# Patient Record
Sex: MALE | Race: WHITE | NOT HISPANIC OR LATINO
[De-identification: names, ages, dates, MRNs, and addresses within clinical notes are randomized per-mention and may not be internally consistent; named-entity substitution may affect disease eponyms.]

---

## 2019-11-11 ENCOUNTER — APPOINTMENT (OUTPATIENT)
Dept: SURGERY | Facility: CLINIC | Age: 54
End: 2019-11-11
Payer: COMMERCIAL

## 2019-11-11 VITALS
HEIGHT: 67 IN | SYSTOLIC BLOOD PRESSURE: 126 MMHG | TEMPERATURE: 98.3 F | BODY MASS INDEX: 25.11 KG/M2 | WEIGHT: 160 LBS | HEART RATE: 78 BPM | DIASTOLIC BLOOD PRESSURE: 83 MMHG

## 2019-11-11 DIAGNOSIS — Z78.9 OTHER SPECIFIED HEALTH STATUS: ICD-10-CM

## 2019-11-11 DIAGNOSIS — Z87.19 PERSONAL HISTORY OF OTHER DISEASES OF THE DIGESTIVE SYSTEM: ICD-10-CM

## 2019-11-11 PROCEDURE — 99214 OFFICE O/P EST MOD 30 MIN: CPT

## 2019-11-11 RX ORDER — SODIUM CHLORIDE 0.65 %
DROPS NASAL
Refills: 0 | Status: ACTIVE | COMMUNITY

## 2020-05-26 ENCOUNTER — APPOINTMENT (OUTPATIENT)
Dept: ORTHOPEDIC SURGERY | Facility: CLINIC | Age: 55
End: 2020-05-26
Payer: COMMERCIAL

## 2020-05-26 VITALS
WEIGHT: 160 LBS | DIASTOLIC BLOOD PRESSURE: 96 MMHG | SYSTOLIC BLOOD PRESSURE: 141 MMHG | HEIGHT: 67 IN | BODY MASS INDEX: 25.11 KG/M2 | HEART RATE: 67 BPM | TEMPERATURE: 97.2 F

## 2020-05-26 DIAGNOSIS — Z80.9 FAMILY HISTORY OF MALIGNANT NEOPLASM, UNSPECIFIED: ICD-10-CM

## 2020-05-26 DIAGNOSIS — M16.11 UNILATERAL PRIMARY OSTEOARTHRITIS, RIGHT HIP: ICD-10-CM

## 2020-05-26 DIAGNOSIS — M16.12 UNILATERAL PRIMARY OSTEOARTHRITIS, LEFT HIP: ICD-10-CM

## 2020-05-26 PROCEDURE — 99204 OFFICE O/P NEW MOD 45 MIN: CPT

## 2020-05-26 PROCEDURE — 73502 X-RAY EXAM HIP UNI 2-3 VIEWS: CPT | Mod: RT

## 2020-06-11 DIAGNOSIS — Z01.818 ENCOUNTER FOR OTHER PREPROCEDURAL EXAMINATION: ICD-10-CM

## 2020-06-18 PROBLEM — M16.11 PRIMARY LOCALIZED OSTEOARTHRITIS OF RIGHT HIP: Status: ACTIVE | Noted: 2020-05-26

## 2020-06-24 RX ORDER — MUPIROCIN 20 MG/G
1 OINTMENT TOPICAL
Qty: 1 | Refills: 0
Start: 2020-06-24 | End: 2020-06-28

## 2020-06-26 VITALS
TEMPERATURE: 98 F | HEART RATE: 94 BPM | OXYGEN SATURATION: 100 % | DIASTOLIC BLOOD PRESSURE: 94 MMHG | HEIGHT: 68 IN | SYSTOLIC BLOOD PRESSURE: 158 MMHG | RESPIRATION RATE: 14 BRPM | WEIGHT: 160.06 LBS

## 2020-06-26 NOTE — H&P PST ADULT - ASSESSMENT
56 yo male is scheduled for right total hip replacement on 7/1/2020 with Dr Lopes at [facility]. 54 yo male is scheduled for right total hip replacement on 7/1/2020 with Dr Lopes at [facility].    COVID testing negative

## 2020-06-26 NOTE — H&P PST ADULT - NSICDXFAMILYHX_GEN_ALL_CORE_FT
FAMILY HISTORY:  Father  Still living? No  Family history of leukemia, Age at diagnosis: Age Unknown

## 2020-06-26 NOTE — H&P PST ADULT - NSICDXPASTMEDICALHX_GEN_ALL_CORE_FT
PAST MEDICAL HISTORY:  IBS (irritable bowel syndrome)     Osteoarthritis of right hip     Post-nasal drip

## 2020-06-26 NOTE — H&P PST ADULT - VENOUS THROMBOEMBOLISM CURRENT STATUS
(1) other risk factor (includes escalating BMI, pack-years of smoking, diabetes requiring insulin, chemotherapy, female gender and length of surgery)/(1) history of inflammatory bowel disease (IBD)

## 2020-06-26 NOTE — H&P PST ADULT - HISTORY OF PRESENT ILLNESS
56 yo male is scheduled for right total hip replacement on 7/1/2020 with Dr Lopes at [facility].  telephone interview as per COVID protocol.  Pain right hip with radiation to right leg rating 7/10 at worst.

## 2020-06-26 NOTE — H&P PST ADULT - NSANTHOSAYNRD_GEN_A_CORE
No. ZEESHAN screening performed.  STOP BANG Legend: 0-2 = LOW Risk; 3-4 = INTERMEDIATE Risk; 5-8 = HIGH Risk

## 2020-06-29 ENCOUNTER — OUTPATIENT (OUTPATIENT)
Dept: OUTPATIENT SERVICES | Facility: HOSPITAL | Age: 55
LOS: 1 days | End: 2020-06-29
Payer: COMMERCIAL

## 2020-06-29 DIAGNOSIS — M16.11 UNILATERAL PRIMARY OSTEOARTHRITIS, RIGHT HIP: ICD-10-CM

## 2020-06-29 DIAGNOSIS — Z98.890 OTHER SPECIFIED POSTPROCEDURAL STATES: Chronic | ICD-10-CM

## 2020-06-29 DIAGNOSIS — Z01.818 ENCOUNTER FOR OTHER PREPROCEDURAL EXAMINATION: ICD-10-CM

## 2020-06-29 LAB — SARS-COV-2 RNA SPEC QL NAA+PROBE: SIGNIFICANT CHANGE UP

## 2020-06-29 PROCEDURE — U0003: CPT

## 2020-06-29 PROCEDURE — G0463: CPT

## 2020-06-30 RX ORDER — CHLORHEXIDINE GLUCONATE 213 G/1000ML
1 SOLUTION TOPICAL DAILY
Refills: 0 | Status: DISCONTINUED | OUTPATIENT
Start: 2020-07-01 | End: 2020-07-02

## 2020-06-30 NOTE — PROGRESS NOTE ADULT - ASSESSMENT
Presurgical evaluation:  1.	Confirm true personal allergy to penicillin or implied allergy. Cefazolin is preferred over vancomycin for perioperative antimicrobial prophylaxis  2.	IV TXA  3.	Hx IBS – confirm that patient can tolerate ASA EC 81mg and Celecoxib for postoperative management  4.	VTE per Caprini score (and patient’s ability to tolerate ASA EC)

## 2020-07-01 ENCOUNTER — TRANSCRIPTION ENCOUNTER (OUTPATIENT)
Age: 55
End: 2020-07-01

## 2020-07-01 ENCOUNTER — INPATIENT (INPATIENT)
Facility: HOSPITAL | Age: 55
LOS: 0 days | Discharge: HOME | DRG: 470 | End: 2020-07-02
Attending: ORTHOPAEDIC SURGERY | Admitting: ORTHOPAEDIC SURGERY
Payer: COMMERCIAL

## 2020-07-01 ENCOUNTER — APPOINTMENT (OUTPATIENT)
Dept: ORTHOPEDIC SURGERY | Facility: HOSPITAL | Age: 55
End: 2020-07-01

## 2020-07-01 ENCOUNTER — RESULT REVIEW (OUTPATIENT)
Age: 55
End: 2020-07-01

## 2020-07-01 VITALS
TEMPERATURE: 98 F | HEIGHT: 68 IN | SYSTOLIC BLOOD PRESSURE: 158 MMHG | HEART RATE: 94 BPM | DIASTOLIC BLOOD PRESSURE: 94 MMHG | WEIGHT: 157.63 LBS | RESPIRATION RATE: 9 BRPM | OXYGEN SATURATION: 100 %

## 2020-07-01 DIAGNOSIS — Z98.890 OTHER SPECIFIED POSTPROCEDURAL STATES: Chronic | ICD-10-CM

## 2020-07-01 DIAGNOSIS — M16.11 UNILATERAL PRIMARY OSTEOARTHRITIS, RIGHT HIP: ICD-10-CM

## 2020-07-01 PROBLEM — R09.82 POSTNASAL DRIP: Chronic | Status: ACTIVE | Noted: 2020-06-26

## 2020-07-01 LAB
ANION GAP SERPL CALC-SCNC: 7 MMOL/L — SIGNIFICANT CHANGE UP (ref 5–17)
BLD GP AB SCN SERPL QL: SIGNIFICANT CHANGE UP
BUN SERPL-MCNC: 11 MG/DL — SIGNIFICANT CHANGE UP (ref 7–23)
CALCIUM SERPL-MCNC: 9.1 MG/DL — SIGNIFICANT CHANGE UP (ref 8.4–10.5)
CHLORIDE SERPL-SCNC: 103 MMOL/L — SIGNIFICANT CHANGE UP (ref 96–108)
CO2 SERPL-SCNC: 29 MMOL/L — SIGNIFICANT CHANGE UP (ref 22–31)
CREAT SERPL-MCNC: 1.09 MG/DL — SIGNIFICANT CHANGE UP (ref 0.5–1.3)
GLUCOSE SERPL-MCNC: 154 MG/DL — HIGH (ref 70–99)
HCT VFR BLD CALC: 41.3 % — SIGNIFICANT CHANGE UP (ref 39–50)
HGB BLD-MCNC: 13.6 G/DL — SIGNIFICANT CHANGE UP (ref 13–17)
POTASSIUM SERPL-MCNC: 4.7 MMOL/L — SIGNIFICANT CHANGE UP (ref 3.5–5.3)
POTASSIUM SERPL-SCNC: 4.7 MMOL/L — SIGNIFICANT CHANGE UP (ref 3.5–5.3)
SODIUM SERPL-SCNC: 139 MMOL/L — SIGNIFICANT CHANGE UP (ref 135–145)

## 2020-07-01 PROCEDURE — 99223 1ST HOSP IP/OBS HIGH 75: CPT

## 2020-07-01 PROCEDURE — 27130 TOTAL HIP ARTHROPLASTY: CPT | Mod: RT

## 2020-07-01 PROCEDURE — 88311 DECALCIFY TISSUE: CPT | Mod: 26

## 2020-07-01 PROCEDURE — 73502 X-RAY EXAM HIP UNI 2-3 VIEWS: CPT | Mod: 26,RT

## 2020-07-01 PROCEDURE — 27130 TOTAL HIP ARTHROPLASTY: CPT | Mod: AS,RT

## 2020-07-01 PROCEDURE — 88305 TISSUE EXAM BY PATHOLOGIST: CPT | Mod: 26

## 2020-07-01 RX ORDER — MAGNESIUM HYDROXIDE 400 MG/1
30 TABLET, CHEWABLE ORAL DAILY
Refills: 0 | Status: DISCONTINUED | OUTPATIENT
Start: 2020-07-01 | End: 2020-07-02

## 2020-07-01 RX ORDER — VANCOMYCIN HCL 1 G
1000 VIAL (EA) INTRAVENOUS ONCE
Refills: 0 | Status: COMPLETED | OUTPATIENT
Start: 2020-07-01 | End: 2020-07-01

## 2020-07-01 RX ORDER — OXYCODONE HYDROCHLORIDE 5 MG/1
10 TABLET ORAL
Refills: 0 | Status: DISCONTINUED | OUTPATIENT
Start: 2020-07-01 | End: 2020-07-02

## 2020-07-01 RX ORDER — ACETAMINOPHEN 500 MG
1000 TABLET ORAL ONCE
Refills: 0 | Status: COMPLETED | OUTPATIENT
Start: 2020-07-01 | End: 2020-07-01

## 2020-07-01 RX ORDER — ASPIRIN/CALCIUM CARB/MAGNESIUM 324 MG
1 TABLET ORAL
Qty: 84 | Refills: 0
Start: 2020-07-01 | End: 2020-08-11

## 2020-07-01 RX ORDER — CELECOXIB 200 MG/1
1 CAPSULE ORAL
Qty: 60 | Refills: 0
Start: 2020-07-01 | End: 2020-07-30

## 2020-07-01 RX ORDER — ONDANSETRON 8 MG/1
4 TABLET, FILM COATED ORAL ONCE
Refills: 0 | Status: DISCONTINUED | OUTPATIENT
Start: 2020-07-01 | End: 2020-07-01

## 2020-07-01 RX ORDER — SENNA PLUS 8.6 MG/1
2 TABLET ORAL
Qty: 0 | Refills: 0 | DISCHARGE
Start: 2020-07-01

## 2020-07-01 RX ORDER — HYDROMORPHONE HYDROCHLORIDE 2 MG/ML
0.5 INJECTION INTRAMUSCULAR; INTRAVENOUS; SUBCUTANEOUS
Refills: 0 | Status: DISCONTINUED | OUTPATIENT
Start: 2020-07-01 | End: 2020-07-01

## 2020-07-01 RX ORDER — ASPIRIN/CALCIUM CARB/MAGNESIUM 324 MG
81 TABLET ORAL EVERY 12 HOURS
Refills: 0 | Status: DISCONTINUED | OUTPATIENT
Start: 2020-07-02 | End: 2020-07-02

## 2020-07-01 RX ORDER — SODIUM CHLORIDE 9 MG/ML
1000 INJECTION, SOLUTION INTRAVENOUS
Refills: 0 | Status: DISCONTINUED | OUTPATIENT
Start: 2020-07-01 | End: 2020-07-01

## 2020-07-01 RX ORDER — TRANEXAMIC ACID 100 MG/ML
1000 INJECTION, SOLUTION INTRAVENOUS ONCE
Refills: 0 | Status: DISCONTINUED | OUTPATIENT
Start: 2020-07-01 | End: 2020-07-01

## 2020-07-01 RX ORDER — OMEPRAZOLE 10 MG/1
1 CAPSULE, DELAYED RELEASE ORAL
Qty: 30 | Refills: 1
Start: 2020-07-01 | End: 2020-08-29

## 2020-07-01 RX ORDER — OXYCODONE HYDROCHLORIDE 5 MG/1
5 TABLET ORAL
Refills: 0 | Status: DISCONTINUED | OUTPATIENT
Start: 2020-07-01 | End: 2020-07-02

## 2020-07-01 RX ORDER — ACETAMINOPHEN 500 MG
1000 TABLET ORAL EVERY 8 HOURS
Refills: 0 | Status: DISCONTINUED | OUTPATIENT
Start: 2020-07-02 | End: 2020-07-02

## 2020-07-01 RX ORDER — ACETAMINOPHEN 500 MG
2 TABLET ORAL
Qty: 0 | Refills: 0 | DISCHARGE
Start: 2020-07-01

## 2020-07-01 RX ORDER — POLYETHYLENE GLYCOL 3350 17 G/17G
17 POWDER, FOR SOLUTION ORAL DAILY
Refills: 0 | Status: DISCONTINUED | OUTPATIENT
Start: 2020-07-01 | End: 2020-07-02

## 2020-07-01 RX ORDER — HYDROMORPHONE HYDROCHLORIDE 2 MG/ML
0.5 INJECTION INTRAMUSCULAR; INTRAVENOUS; SUBCUTANEOUS EVERY 4 HOURS
Refills: 0 | Status: DISCONTINUED | OUTPATIENT
Start: 2020-07-01 | End: 2020-07-02

## 2020-07-01 RX ORDER — SODIUM CHLORIDE 9 MG/ML
1000 INJECTION, SOLUTION INTRAVENOUS
Refills: 0 | Status: DISCONTINUED | OUTPATIENT
Start: 2020-07-01 | End: 2020-07-02

## 2020-07-01 RX ORDER — ACETAMINOPHEN 500 MG
1000 TABLET ORAL EVERY 6 HOURS
Refills: 0 | Status: COMPLETED | OUTPATIENT
Start: 2020-07-01 | End: 2020-07-01

## 2020-07-01 RX ORDER — ONDANSETRON 8 MG/1
4 TABLET, FILM COATED ORAL EVERY 6 HOURS
Refills: 0 | Status: DISCONTINUED | OUTPATIENT
Start: 2020-07-01 | End: 2020-07-02

## 2020-07-01 RX ORDER — APREPITANT 80 MG/1
40 CAPSULE ORAL ONCE
Refills: 0 | Status: COMPLETED | OUTPATIENT
Start: 2020-07-01 | End: 2020-07-01

## 2020-07-01 RX ORDER — POLYETHYLENE GLYCOL 3350 17 G/17G
17 POWDER, FOR SOLUTION ORAL
Qty: 0 | Refills: 0 | DISCHARGE
Start: 2020-07-01

## 2020-07-01 RX ORDER — CELECOXIB 200 MG/1
200 CAPSULE ORAL EVERY 12 HOURS
Refills: 0 | Status: DISCONTINUED | OUTPATIENT
Start: 2020-07-01 | End: 2020-07-02

## 2020-07-01 RX ORDER — PANTOPRAZOLE SODIUM 20 MG/1
40 TABLET, DELAYED RELEASE ORAL
Refills: 0 | Status: DISCONTINUED | OUTPATIENT
Start: 2020-07-01 | End: 2020-07-02

## 2020-07-01 RX ORDER — SENNA PLUS 8.6 MG/1
2 TABLET ORAL AT BEDTIME
Refills: 0 | Status: DISCONTINUED | OUTPATIENT
Start: 2020-07-01 | End: 2020-07-02

## 2020-07-01 RX ORDER — ACETAMINOPHEN 500 MG
1000 TABLET ORAL ONCE
Refills: 0 | Status: COMPLETED | OUTPATIENT
Start: 2020-07-02 | End: 2020-07-02

## 2020-07-01 RX ADMIN — SODIUM CHLORIDE 100 MILLILITER(S): 9 INJECTION, SOLUTION INTRAVENOUS at 18:11

## 2020-07-01 RX ADMIN — CHLORHEXIDINE GLUCONATE 1 APPLICATION(S): 213 SOLUTION TOPICAL at 06:55

## 2020-07-01 RX ADMIN — OXYCODONE HYDROCHLORIDE 10 MILLIGRAM(S): 5 TABLET ORAL at 20:08

## 2020-07-01 RX ADMIN — SENNA PLUS 2 TABLET(S): 8.6 TABLET ORAL at 20:04

## 2020-07-01 RX ADMIN — Medication 250 MILLIGRAM(S): at 18:12

## 2020-07-01 RX ADMIN — Medication 400 MILLIGRAM(S): at 20:04

## 2020-07-01 RX ADMIN — Medication 400 MILLIGRAM(S): at 15:30

## 2020-07-01 RX ADMIN — SODIUM CHLORIDE 75 MILLILITER(S): 9 INJECTION, SOLUTION INTRAVENOUS at 10:15

## 2020-07-01 RX ADMIN — APREPITANT 40 MILLIGRAM(S): 80 CAPSULE ORAL at 06:44

## 2020-07-01 RX ADMIN — Medication 250 MILLIGRAM(S): at 06:44

## 2020-07-01 RX ADMIN — CELECOXIB 200 MILLIGRAM(S): 200 CAPSULE ORAL at 20:04

## 2020-07-01 NOTE — DISCHARGE NOTE PROVIDER - NSDCACTIVITY_GEN_ALL_CORE
Walking - Outdoors allowed/Do not drive or operate machinery/Walking - Indoors allowed/Stairs allowed/Showering allowed

## 2020-07-01 NOTE — PHYSICAL THERAPY INITIAL EVALUATION ADULT - ACTIVE RANGE OF MOTION EXAMINATION, REHAB EVAL
ivis. upper extremity Active ROM was WNL (within normal limits)/LLE Active ROM was WNL (within normal limits)/deficits as listed below/decreased AROM at hip , knee foot WNL

## 2020-07-01 NOTE — DISCHARGE NOTE PROVIDER - CARE PROVIDER_API CALL
Sanjeev Lopes  ORTHOPAEDIC SURGERY  [address] [place] [address]  Phone: [phone]  Fax: [phone]  Established Patient  Scheduled Appointment: 07/20/2020 09:20 AM

## 2020-07-01 NOTE — PHARMACOTHERAPY INTERVENTION NOTE - COMMENTS
Presurgical evaluation for postoperative medication management. Team emailed. Note placed in Council.
Transition of Care video discharge education - medication calendar given to patient
Admission medication reconciliation POD0

## 2020-07-01 NOTE — CONSULT NOTE ADULT - ASSESSMENT
55M with IBS and OA admitted for aftercare following Right THR	    S/P Right THR  POD 0  Continue Bowel and pain control regimen.   Incentive Spirometer for lung expansion.  Work with PT to increase ambulation as per orthopedics.  Monitor Hgb and follow up electrolytes.   Orhtopedics on board and following     Diet  Regular    DVT Prophylaxis  Aspirin BID    Disposition  Full Code/Inpatient  Discharge planning pending hospital course

## 2020-07-01 NOTE — DISCHARGE NOTE PROVIDER - INSTRUCTIONS
regular  For Constipation :   • Increase your water intake. Drink at least 8 glasses of water daily.  • Try adding fiber to your diet by eating fruits, vegetables and foods that are rich in grains.  • If you do experience constipation, you may take an over-the-counter stool softener/laxative such as Tiffany Colace, Senekot, miralax or  Milk of Magnesia.

## 2020-07-01 NOTE — CONSULT NOTE ADULT - SUBJECTIVE AND OBJECTIVE BOX
HPI: 55M with IBS and OA  has been combatting pain right hip for several years which has progressively worsened.  Patient has tried multiple options for pain relief including OTC medication and as well as PT with minimal relief and has undergone elective replacement of right hip successfully.  He is currently resting in bed comfortable with good pain control.     REVIEW OF SYSTEMS:  CONSTITUTIONAL: No fever, weight loss, or fatigue  EYES: No eye pain, visual disturbances, or discharge  ENMT:  No difficulty hearing, tinnitus, vertigo; No sinus or throat pain  NECK: No pain or stiffness  RESPIRATORY: No cough, wheezing, chills or hemoptysis; No shortness of breath  CARDIOVASCULAR: No chest pain, palpitations, dizziness, or leg swelling  GASTROINTESTINAL: No abdominal or epigastric pain. No nausea, vomiting, or hematemesis; No diarrhea or constipation. No melena or hematochezia.  GENITOURINARY: No dysuria, frequency, hematuria, or incontinence  NEUROLOGICAL: No headaches, memory loss, loss of strength, numbness, or tremors  MUSCULOSKELETAL: No muscle or back pain      PAST MEDICAL & SURGICAL HISTORY:  Post-nasal drip  Osteoarthritis of right hip  IBS (irritable bowel syndrome)  History of inguinal hernia repair: infancy  History of nasal surgery: 19 ya      SOCIAL HISTORY:  Negative Tobacco, EtOH and Illicit Drugs    Allergies    penicillin (Hives)    Intolerances        MEDICATIONS  (STANDING):  chlorhexidine 2% Cloths 1 Application(s) Topical daily  lactated ringers. 1000 milliLiter(s) (75 mL/Hr) IV Continuous <Continuous>  tranexamic acid IVPB 1000 milliGRAM(s) IV Intermittent once  tranexamic acid IVPB 1000 milliGRAM(s) IV Intermittent once    MEDICATIONS  (PRN):  HYDROmorphone  Injectable 0.5 milliGRAM(s) IV Push every 10 minutes PRN Moderate Pain (4 - 6)  ondansetron Injectable 4 milliGRAM(s) IV Push once PRN Nausea and/or Vomiting      FAMILY HISTORY:  Family history of leukemia (Father)      Vital Signs Last 24 Hrs  T(C): 36.9 (01 Jul 2020 09:43), Max: 36.9 (01 Jul 2020 09:43)  T(F): 98.5 (01 Jul 2020 09:43), Max: 98.5 (01 Jul 2020 09:43)  HR: 68 (01 Jul 2020 12:30) (68 - 94)  BP: 107/57 (01 Jul 2020 12:30) (97/58 - 158/94)  BP(mean): --  RR: 14 (01 Jul 2020 12:30) (9 - 16)  SpO2: 100% (01 Jul 2020 12:30) (99% - 100%)    PHYSICAL EXAM:    GENERAL: NAD, well-developed  HEAD:  Atraumatic, Normocephalic  EYES: EOMI, PERRLA, conjunctiva and sclera clear  ENMT: No tonsillar erythema, exudates, or enlargement; Moist mucous membranes, Good dentition, No lesions  NECK: Supple, No JVD, Normal thyroid  NERVOUS SYSTEM:  Alert & Oriented X3, Good concentration;  CHEST/LUNG: Clear to auscultation bilaterally; No rales, rhonchi, wheezing, or rubs  HEART: Regular rate and rhythm; No murmurs, rubs, or gallops  ABDOMEN: Soft, Nontender, Nondistended; Bowel sounds present  EXTREMITIES:  2+ Peripheral Pulses, No clubbing, cyanosis, or edema      LABS:              CAPILLARY BLOOD GLUCOSE          RADIOLOGY & ADDITIONAL STUDIES:    EKG:   Personally Reviewed:  [ ] YES     Imaging:   Personally Reviewed:  [ ] YES     Consultant(s) Notes Reviewed:      Care Discussed with Consultants/Other Providers:

## 2020-07-01 NOTE — PHYSICAL THERAPY INITIAL EVALUATION ADULT - ADDITIONAL COMMENTS
Pt lives in house with spouse and 3 teenage children. 3 steps to enter with handrail. Can stay on ground floor upon d/c. Has tub shower on 1st floor, stall shower in basement. Owns a RW , commode.

## 2020-07-01 NOTE — BRIEF OPERATIVE NOTE - NSICDXBRIEFPREOP_GEN_ALL_CORE_FT
PRE-OP DIAGNOSIS:  DJD (degenerative joint disease) of pelvis 01-Jul-2020 09:43:39 Right Deric Guzman

## 2020-07-01 NOTE — DISCHARGE NOTE PROVIDER - NSDCCPTREATMENT_GEN_ALL_CORE_FT
PRINCIPAL PROCEDURE  Procedure: Right total hip arthroplasty  Findings and Treatment: osteoarthritis right hip

## 2020-07-01 NOTE — BRIEF OPERATIVE NOTE - NSICDXBRIEFPOSTOP_GEN_ALL_CORE_FT
POST-OP DIAGNOSIS:  DJD (degenerative joint disease) of pelvis 01-Jul-2020 09:43:42 Right Deric Guzman

## 2020-07-01 NOTE — DISCHARGE NOTE PROVIDER - NSDCCPCAREPLAN_GEN_ALL_CORE_FT
PRINCIPAL DISCHARGE DIAGNOSIS  Diagnosis: Primary osteoarthritis of right hip  Assessment and Plan of Treatment: Physical Therapy /Occupational Therapy for: Ambulation, Transfers , Stairs, ADLs (activities of daily living), isometrics.  TOTAL HIP PRECAUTIONS  *Remember to continue all of the precautions for total hip replacement. Your surgeon will tell you when and if you can move beyond these limitations.  • Do not bend your hip more than 90 degrees   • Do not cross your legs or ankles when laying sitting or standing.  • Do not raise your operated leg up with your knee straight.  • DO NOT bend over at your waist.  • Avoid sitting in low, soft chairs such as sofas and car seats. You should sit on a chair using firm pillows to raise the height of the seat.  • Make sure your bed level is high, so that you maintain proper leg positioning when sitting on the side, or getting in or out.  • When entering and traveling by car, sit in the front passenger seat. Make sure that the car seat is all the way back and semi-reclined before entering.  • Do not allow your knees to come together when sitting or lying in bed. Use abduction pillow.  • Do not take a tub bath yet. May shower POD 5 if wound is dry. Pat dry gently  • Do not resume driving until you have your surgeon’s permission.

## 2020-07-01 NOTE — DISCHARGE NOTE PROVIDER - NSDCFUSCHEDAPPT_GEN_ALL_CORE_FT
ELIZ RINCON ; 07/20/2020 ; [address]  ELIZ RINCON ; 09/01/2020 ; [address] ELIZ RINCON ; 07/20/2020 ; [address]  ELIZ RINCON ; 09/01/2020 ; [address] ELIZ RINCON ; 07/20/2020 ; [address]  ELIZ RINCON ; 09/01/2020 ; [address] ELIZ RINCON ; 07/20/2020 ; [address]  ELIZ RINCON ; 09/01/2020 ; [address]

## 2020-07-01 NOTE — DISCHARGE NOTE PROVIDER - HOSPITAL COURSE
The patient is a 55  y.o. male with severe osteoarthritis of the right hip.  She was evaluated by the PST dept at [facility] and obtained the appropriate medical clearances.    After admission on 7/1/20 and receiving pre-operative parenteral prophylactic antibiotics (vanco), the patient  underwent an  uncomplicated right FABIO by orthopedic surgeon Dr. Sanjeev Lopes.      A medical consultation from the Hospitalist service was obtained for post-operative medical co-management. Typical Physical & occupational therapy modalities post FABIO were performed including ambulation training, range of motion, ADL's, and transfers. Ecotrin 81mg every 12 hrs, along w/ bilat venodynes, was given for DVT prophylaxis.    The patient had a clean appearing surgical incision with no sign of surgical site infections and had a stable neuro / vascular exam of the operated extremity.  After progression of mobility guided by the PT/ OT staff,  the patient was felt to benefit from further rehabilitative care for restoration to level of function. This was felt to best be accomplished at home.  Discharge and Orthopedic Care instructions were delineated in the Discharge Plan and reviewed with the patient. All medications were delineated in the medication reconciliation tool and key points were reviewed with the patient. They were deemed stable from an Orthopedic & medical standpoint for discharge today.    Upon completion of Home care  they will be  following up with Dr. Lopes for office  follow up Orthopedic care.

## 2020-07-01 NOTE — DISCHARGE NOTE PROVIDER - NSDCMRMEDTOKEN_GEN_ALL_CORE_FT
ipratropium 21 mcg/inh (0.03%) nasal spray: 2 spray(s) nasal 3 times a day  mupirocin 2% topical ointment: 1 application in each nostril 2 times a day x 5 days acetaminophen 500 mg oral tablet: 2 tab(s) orally every 8 hours for 21 to 3 weeks  aspirin 81 mg oral delayed release tablet: 1 tab(s) orally every 12 hours  celecoxib 200 mg oral capsule: 1 cap(s) orally every 12 hours  ipratropium 21 mcg/inh (0.03%) nasal spray: 2 spray(s) nasal 3 times a day  omeprazole 20 mg oral delayed release tablet: 1 tab(s) orally once a day   polyethylene glycol 3350 oral powder for reconstitution: 17 gram(s) orally once a day, As needed, Constipation  senna oral tablet: 2 tab(s) orally once a day (at bedtime) acetaminophen 500 mg oral tablet: 2 tab(s) orally every 8 hours for 21 to 3 weeks  aspirin 81 mg oral delayed release tablet: 1 tab(s) orally every 12 hours  celecoxib 200 mg oral capsule: 1 cap(s) orally every 12 hours  ipratropium 21 mcg/inh (0.03%) nasal spray: 2 spray(s) nasal 3 times a day  omeprazole 20 mg oral delayed release tablet: 1 tab(s) orally once a day   oxyCODONE 5 mg oral tablet: 1 tab(s) orally every 4 hours, As Needed -Moderate Pain (4 - 6) MDD:6  Reference #: [id number]  polyethylene glycol 3350 oral powder for reconstitution: 17 gram(s) orally once a day, As needed, Constipation  senna oral tablet: 2 tab(s) orally once a day (at bedtime) acetaminophen 500 mg oral tablet: 2 tab(s) orally every 8 hours for 21 to 3 weeks  aspirin 81 mg oral delayed release tablet: 1 tab(s) orally every 12 hours  celecoxib 200 mg oral capsule: 1 cap(s) orally every 12 hours  hip chair: Dx: Right THR  ipratropium 21 mcg/inh (0.03%) nasal spray: 2 spray(s) nasal 3 times a day  omeprazole 20 mg oral delayed release tablet: 1 tab(s) orally once a day   oxyCODONE 5 mg oral tablet: 1 tab(s) orally every 4 hours, As Needed -Moderate Pain (4 - 6) MDD:6  Reference #: [id number]  polyethylene glycol 3350 oral powder for reconstitution: 17 gram(s) orally once a day, As needed, Constipation  senna oral tablet: 2 tab(s) orally once a day (at bedtime)

## 2020-07-01 NOTE — DISCHARGE NOTE PROVIDER - PROVIDER TOKENS
PROVIDER:[TOKEN:[[id number]],SCHEDULEDAPPT:[07/20/2020],SCHEDULEDAPPTTIME:[09:20 AM],ESTABLISHEDPATIENT:[T]]

## 2020-07-02 ENCOUNTER — TRANSCRIPTION ENCOUNTER (OUTPATIENT)
Age: 55
End: 2020-07-02

## 2020-07-02 VITALS
RESPIRATION RATE: 17 BRPM | TEMPERATURE: 98 F | HEART RATE: 90 BPM | DIASTOLIC BLOOD PRESSURE: 71 MMHG | SYSTOLIC BLOOD PRESSURE: 109 MMHG | OXYGEN SATURATION: 95 %

## 2020-07-02 LAB
ANION GAP SERPL CALC-SCNC: 5 MMOL/L — SIGNIFICANT CHANGE UP (ref 5–17)
BUN SERPL-MCNC: 13 MG/DL — SIGNIFICANT CHANGE UP (ref 7–23)
CALCIUM SERPL-MCNC: 8.7 MG/DL — SIGNIFICANT CHANGE UP (ref 8.4–10.5)
CHLORIDE SERPL-SCNC: 105 MMOL/L — SIGNIFICANT CHANGE UP (ref 96–108)
CO2 SERPL-SCNC: 28 MMOL/L — SIGNIFICANT CHANGE UP (ref 22–31)
CREAT SERPL-MCNC: 0.94 MG/DL — SIGNIFICANT CHANGE UP (ref 0.5–1.3)
GLUCOSE SERPL-MCNC: 115 MG/DL — HIGH (ref 70–99)
HCT VFR BLD CALC: 35.1 % — LOW (ref 39–50)
HGB BLD-MCNC: 11.5 G/DL — LOW (ref 13–17)
MCHC RBC-ENTMCNC: 29.9 PG — SIGNIFICANT CHANGE UP (ref 27–34)
MCHC RBC-ENTMCNC: 32.8 GM/DL — SIGNIFICANT CHANGE UP (ref 32–36)
MCV RBC AUTO: 91.2 FL — SIGNIFICANT CHANGE UP (ref 80–100)
NRBC # BLD: 0 /100 WBCS — SIGNIFICANT CHANGE UP (ref 0–0)
PLATELET # BLD AUTO: 206 K/UL — SIGNIFICANT CHANGE UP (ref 150–400)
POTASSIUM SERPL-MCNC: 4.8 MMOL/L — SIGNIFICANT CHANGE UP (ref 3.5–5.3)
POTASSIUM SERPL-SCNC: 4.8 MMOL/L — SIGNIFICANT CHANGE UP (ref 3.5–5.3)
RBC # BLD: 3.85 M/UL — LOW (ref 4.2–5.8)
RBC # FLD: 12.5 % — SIGNIFICANT CHANGE UP (ref 10.3–14.5)
SARS-COV-2 IGG SERPL QL IA: NEGATIVE — SIGNIFICANT CHANGE UP
SARS-COV-2 IGM SERPL IA-ACNC: 0.08 INDEX — SIGNIFICANT CHANGE UP
SODIUM SERPL-SCNC: 138 MMOL/L — SIGNIFICANT CHANGE UP (ref 135–145)
WBC # BLD: 13.02 K/UL — HIGH (ref 3.8–10.5)
WBC # FLD AUTO: 13.02 K/UL — HIGH (ref 3.8–10.5)

## 2020-07-02 PROCEDURE — 88311 DECALCIFY TISSUE: CPT

## 2020-07-02 PROCEDURE — 73502 X-RAY EXAM HIP UNI 2-3 VIEWS: CPT

## 2020-07-02 PROCEDURE — 86900 BLOOD TYPING SEROLOGIC ABO: CPT

## 2020-07-02 PROCEDURE — 86850 RBC ANTIBODY SCREEN: CPT

## 2020-07-02 PROCEDURE — 86901 BLOOD TYPING SEROLOGIC RH(D): CPT

## 2020-07-02 PROCEDURE — C1713: CPT

## 2020-07-02 PROCEDURE — 85014 HEMATOCRIT: CPT

## 2020-07-02 PROCEDURE — 36415 COLL VENOUS BLD VENIPUNCTURE: CPT

## 2020-07-02 PROCEDURE — 97161 PT EVAL LOW COMPLEX 20 MIN: CPT

## 2020-07-02 PROCEDURE — 97165 OT EVAL LOW COMPLEX 30 MIN: CPT

## 2020-07-02 PROCEDURE — C1776: CPT

## 2020-07-02 PROCEDURE — 97116 GAIT TRAINING THERAPY: CPT

## 2020-07-02 PROCEDURE — 80048 BASIC METABOLIC PNL TOTAL CA: CPT

## 2020-07-02 PROCEDURE — 88305 TISSUE EXAM BY PATHOLOGIST: CPT

## 2020-07-02 PROCEDURE — 86769 SARS-COV-2 COVID-19 ANTIBODY: CPT

## 2020-07-02 PROCEDURE — 97530 THERAPEUTIC ACTIVITIES: CPT

## 2020-07-02 PROCEDURE — 85018 HEMOGLOBIN: CPT

## 2020-07-02 PROCEDURE — 85027 COMPLETE CBC AUTOMATED: CPT

## 2020-07-02 PROCEDURE — 94664 DEMO&/EVAL PT USE INHALER: CPT

## 2020-07-02 PROCEDURE — 99232 SBSQ HOSP IP/OBS MODERATE 35: CPT

## 2020-07-02 RX ORDER — OXYCODONE HYDROCHLORIDE 5 MG/1
1 TABLET ORAL
Qty: 42 | Refills: 0
Start: 2020-07-02 | End: 2020-07-08

## 2020-07-02 RX ADMIN — OXYCODONE HYDROCHLORIDE 5 MILLIGRAM(S): 5 TABLET ORAL at 09:15

## 2020-07-02 RX ADMIN — CELECOXIB 200 MILLIGRAM(S): 200 CAPSULE ORAL at 09:15

## 2020-07-02 RX ADMIN — Medication 400 MILLIGRAM(S): at 02:43

## 2020-07-02 RX ADMIN — OXYCODONE HYDROCHLORIDE 10 MILLIGRAM(S): 5 TABLET ORAL at 18:06

## 2020-07-02 RX ADMIN — Medication 81 MILLIGRAM(S): at 06:05

## 2020-07-02 RX ADMIN — Medication 1000 MILLIGRAM(S): at 09:14

## 2020-07-02 RX ADMIN — Medication 1000 MILLIGRAM(S): at 18:05

## 2020-07-02 RX ADMIN — PANTOPRAZOLE SODIUM 40 MILLIGRAM(S): 20 TABLET, DELAYED RELEASE ORAL at 06:05

## 2020-07-02 RX ADMIN — Medication 81 MILLIGRAM(S): at 18:06

## 2020-07-02 RX ADMIN — OXYCODONE HYDROCHLORIDE 5 MILLIGRAM(S): 5 TABLET ORAL at 13:49

## 2020-07-02 NOTE — PROGRESS NOTE ADULT - ASSESSMENT
55M with IBS and OA admitted for aftercare following Right THR	    S/P Right THR  POD 0  Continue Bowel and pain control regimen.   Incentive Spirometer for lung expansion.  Work with PT to increase ambulation as per orthopedics.  Monitor Hgb and follow up electrolytes.   Orhtopedics on board and following     Leukocytosis  Most likely reactive; Remains afebrile and no signs or symptoms of Infection  Monitor Off Abx and will trend CBC     Diet  Regular    DVT Prophylaxis  Aspirin BID    Disposition  Full Code/Inpatient  Patient medically optimized for discharge home if cleared by Ortho and PT 55M with IBS and OA admitted for aftercare following Right THR	    S/P Right THR  POD 1  Continue Bowel and pain control regimen.   Incentive Spirometer for lung expansion.  Work with PT to increase ambulation as per orthopedics.  Monitor Hgb and follow up electrolytes.   Orhtopedics on board and following     Leukocytosis  Most likely reactive; Remains afebrile and no signs or symptoms of Infection  Monitor Off Abx and will trend CBC     Diet  Regular    DVT Prophylaxis  Aspirin BID    Disposition  Full Code/Inpatient  Patient medically optimized for discharge home if cleared by Ortho and PT

## 2020-07-02 NOTE — DISCHARGE NOTE NURSING/CASE MANAGEMENT/SOCIAL WORK - NSSCCONTNUM_GEN_ALL_CORE
per Home care your insurance requires: please inform pt has copay every nurse $50.00 and every physical therapy / occupational  $38.80 and hha 33.00 thank you

## 2020-07-02 NOTE — OCCUPATIONAL THERAPY INITIAL EVALUATION ADULT - ADDITIONAL COMMENTS
Lives with spouse. 3 steps to enter. Has both a tub and stall. Has a RW, commode. Hip kit ordered via CM. Looking into purchasing a hip chair

## 2020-07-02 NOTE — DISCHARGE NOTE NURSING/CASE MANAGEMENT/SOCIAL WORK - PATIENT PORTAL LINK FT
You can access the FollowMyHealth Patient Portal offered by [facility] by registering at the following website: [email or web address]. By joining [employer] FollowMyHealth portal, you will also be able to view your health information using other applications (apps) compatible with our system.

## 2020-07-02 NOTE — DISCHARGE NOTE NURSING/CASE MANAGEMENT/SOCIAL WORK - NSDCDMETYPESERV_GEN_ALL_CORE_FT
You said you had a delivery of Rolling walker , commode and shower chair prior to admission. You said you had a delivery of Rolling walker , commode and shower chair prior to admission.  Hip Kit .

## 2020-07-02 NOTE — DISCHARGE NOTE NURSING/CASE MANAGEMENT/SOCIAL WORK - NSSCNAMETXT_GEN_ALL_CORE
[facility] - [phone]  Nurse to visit the day after hospital discharge; physical therapist to follow. Please contact the home care agency at the above phone number if you have not heard from them by 12 noon on the day after your hospital discharge.

## 2020-07-02 NOTE — PROGRESS NOTE ADULT - SUBJECTIVE AND OBJECTIVE BOX
Post Op Day # 1    SUBJECTIVE    54yo Male status post Right THR .   Patient is alert and comfortable.    Pain is controlled with current pain regimen.  Denies nausea, vomiting, chest pain, shortness of breath, abdominal pain or fever.   No new complaints.    OBJECTIVE    Vital Signs Last 24 Hrs  T(C): 36.4 (02 Jul 2020 07:29), Max: 37.1 (01 Jul 2020 19:06)  T(F): 97.5 (02 Jul 2020 07:29), Max: 98.8 (01 Jul 2020 19:06)  HR: 71 (02 Jul 2020 07:29) (64 - 91)  BP: 100/64 (02 Jul 2020 07:29) (91/53 - 123/81)  BP(mean): --  RR: 18 (02 Jul 2020 07:29) (10 - 18)  SpO2: 97% (02 Jul 2020 07:29) (97% - 100%)  I&O's Summary    01 Jul 2020 07:01  -  02 Jul 2020 07:00  --------------------------------------------------------  IN: 1960 mL / OUT: 2450 mL / NET: -490 mL        PHYSICAL EXAM    Right Hip incision is clean, dry and intact.   No erythema/ No exudate/ No blistering/ No ecchymosis.   The calf is supple/nontender.   Sensation to light touch is grossly intact distally.   Motor function distally is intact.   No foot drop.   (2+) dorsalis pedis pulse. Capillary refill is less than 2 seconds. No cyanosis.                          11.5<L>  13.02<H> )-----------( 206      ( 02 Jul 2020 06:24 )             35.1<L>  02 Jul 2020 06:24                        13.6   x     )-----------( x        ( 01 Jul 2020 18:06 )             41.3   01 Jul 2020 18:06    02 Jul 2020 06:24    138    |  105    |  13     ----------------------------<  115<H>  4.8     |  28     |  0.94   01 Jul 2020 18:06    139    |  103    |  11     ----------------------------<  154<H>  4.7     |  29     |  1.09     Ca    8.7        02 Jul 2020 06:24  Ca    9.1        01 Jul 2020 18:06        ASSESSMENT AND PLAN    - Orthopedically stable  - DVT prophylaxis: PAS + Ecotrin 81mg twice daily  -  HO prophylaxis: Celebrex 200mg PO twice daily x21 days  - Continue physical therapy and occupational therapy  - Weight bearing as tolerated of the right lower extremity with assistance of a walker  - Incentive spirometry encouraged  - Pain control as clinically indicated  - Disposition: Home when cleared my medicine/PT/OT
Discharge medication calendar:  Aspirin EC 81mg q12h x 6 weeks  APAP 1000mg q8h x 2-3 weeks  Celecoxib 200mg q12h x 21 days  Omeprazole 20mg QAM x 6 weeks  Narcotic PRN  Docusate 100mg TID while taking narcotic  Miralax, Senna, or Bisacodyl PRN for treatment of constipation
Orthopaedic Post Op  Note    Procedure: Right  Total Hip Replacement  Surgeon: Dr Lopes    55y Male resting comfortable  in PACU, without complaints.    PE:  Vital Signs Last 24 Hrs  T(C): 36.9 (01 Jul 2020 09:43), Max: 36.9 (01 Jul 2020 09:43)  T(F): 98.5 (01 Jul 2020 09:43), Max: 98.5 (01 Jul 2020 09:43)  HR: 71 (01 Jul 2020 11:30) (70 - 94)  BP: 107/56 (01 Jul 2020 11:30) (97/58 - 158/94)  BP(mean): --  RR: 13 (01 Jul 2020 11:30) (9 - 16)  SpO2: 100% (01 Jul 2020 11:30) (99% - 100%)  General: Pt alert and oriented   Lungs: + BS CTA bilaterally  Heart: +S1 & S2 heard, RRR  Abd: + BS heard, soft, NT, ND  Right Hip Dressing: C/D/I   Hip Abduction pillow intact  Bilateral LEs:    Spinal anesthesia still working.  Motor:   0/5 dorsiflexion, plantarflexion, EHL  Sensation intact to LT   + DP Pulses    Post Op labs: Pending post op labs             A/P: 55y Male POD#0 s/p Right  Total Hip Replacement  - Stable  - Oxy/dilaudid for Pain Control ---> reeval p spinal wears off  - DVT ppx: Ecotrin + PAS  - Tiffany op IV abx: Vanco --> PCN allergy  - PT, OT per protocol  - F/U Postop Labs  - Spinal Anesthesia wearing off, PACU----> Surgical Floor when +Motor distally
Presurgical evaluation;    Allergies:  penicillin: Hives *** PLEASE CONFIRM WITH PATIENT UPON ARRIVAL. MEDICAL CLEARANCE SAYS PATIENT'S FATHER AND BROTHER ARE ALLERGIC SO HE ASSUMES HE IS ALLERGIC --- Cefazolin is a better agent for perioperative antimicrobial prophylaxis    Home Medications:   · 	mupirocin 2% topical ointment 1 application in each nostril 2 times a day x 5 days  · 	ipratropium 21 mcg/inh (0.03%) nasal spray 2 spray(s) nasal 3 times a day    PAST MEDICAL HISTORY:  IBS (irritable bowel syndrome)   Osteoarthritis of right hip   Post-nasal drip.     PAST SURGICAL HISTORY:  History of inguinal hernia repair infancy  History of nasal surgery 19 ya.     PST values of interest:  SCr 0.99 mg/dL  QTc 416ms (WNL)  LFTs WNL
Subjective: Patient seen and examined. Doing well with no overnight events.  Pain controlled.     MEDICATIONS  (STANDING):  acetaminophen   Tablet .. 1000 milliGRAM(s) Oral every 8 hours  aspirin enteric coated 81 milliGRAM(s) Oral every 12 hours  celecoxib 200 milliGRAM(s) Oral every 12 hours  chlorhexidine 2% Cloths 1 Application(s) Topical daily  lactated ringers. 1000 milliLiter(s) (100 mL/Hr) IV Continuous <Continuous>  pantoprazole    Tablet 40 milliGRAM(s) Oral before breakfast  senna 2 Tablet(s) Oral at bedtime    MEDICATIONS  (PRN):  aluminum hydroxide/magnesium hydroxide/simethicone Suspension 30 milliLiter(s) Oral four times a day PRN Indigestion  HYDROmorphone  Injectable 0.5 milliGRAM(s) IV Push every 4 hours PRN breakthrought pain  magnesium hydroxide Suspension 30 milliLiter(s) Oral daily PRN Constipation  ondansetron Injectable 4 milliGRAM(s) IV Push every 6 hours PRN Nausea and/or Vomiting  oxyCODONE    IR 5 milliGRAM(s) Oral every 3 hours PRN Moderate Pain (4 - 6)  oxyCODONE    IR 10 milliGRAM(s) Oral every 3 hours PRN Severe Pain (7 - 10)  polyethylene glycol 3350 17 Gram(s) Oral daily PRN Constipation      Allergies    penicillin (Hives)    Intolerances        Vital Signs Last 24 Hrs  T(C): 36.4 (02 Jul 2020 07:29), Max: 37.1 (01 Jul 2020 19:06)  T(F): 97.5 (02 Jul 2020 07:29), Max: 98.8 (01 Jul 2020 19:06)  HR: 63 (02 Jul 2020 09:15) (63 - 91)  BP: 104/69 (02 Jul 2020 09:15) (91/53 - 123/81)  BP(mean): --  RR: 18 (02 Jul 2020 07:29) (12 - 18)  SpO2: 99% (02 Jul 2020 09:15) (97% - 100%)    PHYSICAL EXAM:  GENERAL: NAD, well-groomed, well-developed  HEAD:  Atraumatic, Normocephalic  ENMT: Moist mucous membranes,   NECK: Supple, No JVD, Normal thyroid  NERVOUS SYSTEM:  All 4 extremities mobile, no gross sensory deficits.   CHEST/LUNG: Clear to auscultation bilaterally; No rales, rhonchi, wheezing, or rubs  HEART: Regular rate and rhythm; No murmurs, rubs, or gallops  ABDOMEN: Soft, Nontender, Nondistended; Bowel sounds present  EXTREMITIES:  2+ Peripheral Pulses, No clubbing, cyanosis, or edema      LABS:                        11.5   13.02 )-----------( 206      ( 02 Jul 2020 06:24 )             35.1     02 Jul 2020 06:24    138    |  105    |  13     ----------------------------<  115    4.8     |  28     |  0.94     Ca    8.7        02 Jul 2020 06:24          CAPILLARY BLOOD GLUCOSE          RADIOLOGY & ADDITIONAL TESTS:    Imaging Personally Reviewed:  [ ] YES     Consultant(s) Notes Reviewed:      Care Discussed with Consultants/Other Providers:    Advanced Directives: [ ] DNR  [ ] No feeding tube  [ ] MOLST in chart  [ ] MOLST completed today  [ ] Unknown

## 2020-07-02 NOTE — DISCHARGE NOTE NURSING/CASE MANAGEMENT/SOCIAL WORK - NSDCFUADDAPPT_GEN_ALL_CORE_FT
Follow up Dr Lopes at [place] office.  It is advisable to follow up w/ you r PCP 2-3 weeks post op

## 2020-07-20 ENCOUNTER — APPOINTMENT (OUTPATIENT)
Dept: ORTHOPEDIC SURGERY | Facility: CLINIC | Age: 55
End: 2020-07-20
Payer: COMMERCIAL

## 2020-07-20 VITALS
HEART RATE: 91 BPM | WEIGHT: 160 LBS | DIASTOLIC BLOOD PRESSURE: 69 MMHG | HEIGHT: 67 IN | TEMPERATURE: 98.4 F | BODY MASS INDEX: 25.11 KG/M2 | SYSTOLIC BLOOD PRESSURE: 123 MMHG

## 2020-07-20 PROCEDURE — 73502 X-RAY EXAM HIP UNI 2-3 VIEWS: CPT | Mod: RT

## 2020-07-20 PROCEDURE — 99024 POSTOP FOLLOW-UP VISIT: CPT

## 2020-07-20 RX ORDER — CLINDAMYCIN HYDROCHLORIDE 300 MG/1
300 CAPSULE ORAL
Qty: 20 | Refills: 2 | Status: ACTIVE | COMMUNITY
Start: 2020-07-20 | End: 1900-01-01

## 2020-08-10 ENCOUNTER — APPOINTMENT (OUTPATIENT)
Dept: ORTHOPEDIC SURGERY | Facility: CLINIC | Age: 55
End: 2020-08-10
Payer: COMMERCIAL

## 2020-08-10 VITALS — TEMPERATURE: 98.3 F

## 2020-08-10 PROCEDURE — 99024 POSTOP FOLLOW-UP VISIT: CPT

## 2020-08-10 PROCEDURE — 73502 X-RAY EXAM HIP UNI 2-3 VIEWS: CPT | Mod: RT

## 2020-08-10 RX ORDER — LEVOFLOXACIN 500 MG/1
500 TABLET, FILM COATED ORAL
Qty: 7 | Refills: 0 | Status: DISCONTINUED | COMMUNITY
Start: 2020-02-18

## 2020-08-10 RX ORDER — PREDNISONE 20 MG/1
20 TABLET ORAL
Qty: 28 | Refills: 0 | Status: DISCONTINUED | COMMUNITY
Start: 2020-02-24

## 2020-08-10 RX ORDER — IPRATROPIUM BROMIDE 42 UG/1
0.06 SPRAY NASAL
Qty: 45 | Refills: 0 | Status: ACTIVE | COMMUNITY
Start: 2020-08-08

## 2020-08-10 RX ORDER — BENZONATATE 100 MG/1
100 CAPSULE ORAL
Qty: 30 | Refills: 0 | Status: ACTIVE | COMMUNITY
Start: 2020-02-18

## 2020-08-10 RX ORDER — AZITHROMYCIN 250 MG/1
250 TABLET, FILM COATED ORAL
Qty: 6 | Refills: 0 | Status: DISCONTINUED | COMMUNITY
Start: 2020-07-29

## 2020-08-10 RX ORDER — MUPIROCIN 20 MG/G
2 OINTMENT TOPICAL
Qty: 22 | Refills: 0 | Status: DISCONTINUED | COMMUNITY
Start: 2020-06-24

## 2020-08-10 RX ORDER — CELECOXIB 200 MG/1
200 CAPSULE ORAL
Qty: 60 | Refills: 0 | Status: ACTIVE | COMMUNITY
Start: 2020-07-01

## 2020-08-10 RX ORDER — FAMCICLOVIR 500 MG/1
500 TABLET, FILM COATED ORAL
Qty: 21 | Refills: 0 | Status: DISCONTINUED | COMMUNITY
Start: 2020-06-18

## 2020-09-01 ENCOUNTER — APPOINTMENT (OUTPATIENT)
Dept: ORTHOPEDIC SURGERY | Facility: CLINIC | Age: 55
End: 2020-09-01
Payer: COMMERCIAL

## 2020-09-01 VITALS — TEMPERATURE: 98 F

## 2020-09-01 PROCEDURE — 73502 X-RAY EXAM HIP UNI 2-3 VIEWS: CPT | Mod: RT

## 2020-09-01 PROCEDURE — 99024 POSTOP FOLLOW-UP VISIT: CPT

## 2020-09-12 ENCOUNTER — APPOINTMENT (OUTPATIENT)
Dept: MRI IMAGING | Facility: CLINIC | Age: 55
End: 2020-09-12
Payer: COMMERCIAL

## 2020-09-12 ENCOUNTER — OUTPATIENT (OUTPATIENT)
Dept: OUTPATIENT SERVICES | Facility: HOSPITAL | Age: 55
LOS: 1 days | End: 2020-09-12
Payer: COMMERCIAL

## 2020-09-12 DIAGNOSIS — Z98.890 OTHER SPECIFIED POSTPROCEDURAL STATES: Chronic | ICD-10-CM

## 2020-09-12 DIAGNOSIS — Z00.8 ENCOUNTER FOR OTHER GENERAL EXAMINATION: ICD-10-CM

## 2020-09-12 DIAGNOSIS — R51 HEADACHE: ICD-10-CM

## 2020-09-12 PROCEDURE — 70551 MRI BRAIN STEM W/O DYE: CPT

## 2020-09-12 PROCEDURE — 70551 MRI BRAIN STEM W/O DYE: CPT | Mod: 26

## 2020-10-12 ENCOUNTER — APPOINTMENT (OUTPATIENT)
Dept: SURGERY | Facility: CLINIC | Age: 55
End: 2020-10-12
Payer: COMMERCIAL

## 2020-10-12 VITALS
WEIGHT: 165 LBS | BODY MASS INDEX: 25.01 KG/M2 | HEIGHT: 68 IN | HEART RATE: 86 BPM | SYSTOLIC BLOOD PRESSURE: 135 MMHG | DIASTOLIC BLOOD PRESSURE: 95 MMHG

## 2020-10-12 PROCEDURE — 99214 OFFICE O/P EST MOD 30 MIN: CPT

## 2020-10-23 ENCOUNTER — OUTPATIENT (OUTPATIENT)
Dept: OUTPATIENT SERVICES | Facility: HOSPITAL | Age: 55
LOS: 1 days | Discharge: HOME | End: 2020-10-23
Payer: COMMERCIAL

## 2020-10-23 VITALS
OXYGEN SATURATION: 98 % | WEIGHT: 164.91 LBS | TEMPERATURE: 98 F | DIASTOLIC BLOOD PRESSURE: 79 MMHG | HEART RATE: 78 BPM | HEIGHT: 68 IN | SYSTOLIC BLOOD PRESSURE: 115 MMHG

## 2020-10-23 DIAGNOSIS — Z98.890 OTHER SPECIFIED POSTPROCEDURAL STATES: Chronic | ICD-10-CM

## 2020-10-23 DIAGNOSIS — Z96.641 PRESENCE OF RIGHT ARTIFICIAL HIP JOINT: Chronic | ICD-10-CM

## 2020-10-23 DIAGNOSIS — Z96.642 PRESENCE OF LEFT ARTIFICIAL HIP JOINT: Chronic | ICD-10-CM

## 2020-10-23 DIAGNOSIS — Z01.818 ENCOUNTER FOR OTHER PREPROCEDURAL EXAMINATION: ICD-10-CM

## 2020-10-23 DIAGNOSIS — K40.90 UNILATERAL INGUINAL HERNIA, WITHOUT OBSTRUCTION OR GANGRENE, NOT SPECIFIED AS RECURRENT: ICD-10-CM

## 2020-10-23 LAB
ANION GAP SERPL CALC-SCNC: 1 MMOL/L — LOW (ref 5–17)
APTT BLD: 29.7 SEC — SIGNIFICANT CHANGE UP (ref 27.5–35.5)
BUN SERPL-MCNC: 14 MG/DL — SIGNIFICANT CHANGE UP (ref 7–23)
CALCIUM SERPL-MCNC: 9.1 MG/DL — SIGNIFICANT CHANGE UP (ref 8.5–10.1)
CHLORIDE SERPL-SCNC: 102 MMOL/L — SIGNIFICANT CHANGE UP (ref 96–108)
CO2 SERPL-SCNC: 32 MMOL/L — HIGH (ref 22–31)
CREAT SERPL-MCNC: 0.87 MG/DL — SIGNIFICANT CHANGE UP (ref 0.5–1.3)
GLUCOSE SERPL-MCNC: 87 MG/DL — SIGNIFICANT CHANGE UP (ref 70–99)
HCT VFR BLD CALC: 48.1 % — SIGNIFICANT CHANGE UP (ref 39–50)
HGB BLD-MCNC: 15.3 G/DL — SIGNIFICANT CHANGE UP (ref 13–17)
INR BLD: 1.01 RATIO — SIGNIFICANT CHANGE UP (ref 0.88–1.16)
MCHC RBC-ENTMCNC: 28.1 PG — SIGNIFICANT CHANGE UP (ref 27–34)
MCHC RBC-ENTMCNC: 31.8 GM/DL — LOW (ref 32–36)
MCV RBC AUTO: 88.4 FL — SIGNIFICANT CHANGE UP (ref 80–100)
NRBC # BLD: 0 /100 WBCS — SIGNIFICANT CHANGE UP (ref 0–0)
PLATELET # BLD AUTO: 279 K/UL — SIGNIFICANT CHANGE UP (ref 150–400)
POTASSIUM SERPL-MCNC: 4 MMOL/L — SIGNIFICANT CHANGE UP (ref 3.5–5.3)
POTASSIUM SERPL-SCNC: 4 MMOL/L — SIGNIFICANT CHANGE UP (ref 3.5–5.3)
PROTHROM AB SERPL-ACNC: 11.7 SEC — SIGNIFICANT CHANGE UP (ref 10.6–13.6)
RBC # BLD: 5.44 M/UL — SIGNIFICANT CHANGE UP (ref 4.2–5.8)
RBC # FLD: 13 % — SIGNIFICANT CHANGE UP (ref 10.3–14.5)
SODIUM SERPL-SCNC: 135 MMOL/L — SIGNIFICANT CHANGE UP (ref 135–145)
WBC # BLD: 5.45 K/UL — SIGNIFICANT CHANGE UP (ref 3.8–10.5)
WBC # FLD AUTO: 5.45 K/UL — SIGNIFICANT CHANGE UP (ref 3.8–10.5)

## 2020-10-23 PROCEDURE — 93010 ELECTROCARDIOGRAM REPORT: CPT

## 2020-10-23 RX ORDER — IPRATROPIUM BROMIDE 21 MCG
2 AEROSOL, SPRAY (ML) NASAL
Qty: 0 | Refills: 0 | DISCHARGE

## 2020-10-23 NOTE — H&P PST ADULT - ASSESSMENT
55 year old male with no significant past medical history present with left inguinal. He reports that the hernia has been present for the past 4 years, he denies pain, and discomfort. He is scheduled for a left inguinal hernia repair on 2020.    CAPRINI SCORE [CLOT]    AGE RELATED RISK FACTORS                                                       MOBILITY RELATED FACTORS  [x ] Age 41-60 years                                            (1 Point)                  [ ] Bed rest                                                        (1 Point)  [ ] Age: 61-74 years                                           (2 Points)                 [ ] Plaster cast                                                   (2 Points)  [ ] Age= 75 years                                              (3 Points)                 [ ] Bed bound for more than 72 hours                 (2 Points)    DISEASE RELATED RISK FACTORS                                               GENDER SPECIFIC FACTORS  [ ] Edema in the lower extremities                       (1 Point)                  [ ] Pregnancy                                                     (1 Point)  [ ] Varicose veins                                               (1 Point)                  [ ] Post-partum < 6 weeks                                   (1 Point)             [ ] BMI > 25 Kg/m2                                            (1 Point)                  [ ] Hormonal therapy  or oral contraception          (1 Point)                 [ ] Sepsis (in the previous month)                        (1 Point)                  [ ] History of pregnancy complications                 (1 point)  [ ] Pneumonia or serious lung disease                                               [ ] Unexplained or recurrent                     (1 Point)           (in the previous month)                               (1 Point)  [ ] Abnormal pulmonary function test                     (1 Point)                 SURGERY RELATED RISK FACTORS  [ ] Acute myocardial infarction                              (1 Point)                 [ ]  Section                                             (1 Point)  [ ] Congestive heart failure (in the previous month)  (1 Point)               [ ] Minor surgery                                                  (1 Point)   [ ] Inflammatory bowel disease                             (1 Point)                 [ ] Arthroscopic surgery                                        (2 Points)  [ ] Central venous access                                      (2 Points)                [x] General surgery lasting more than 45 minutes   (2 Points)       [ ] Stroke (in the previous month)                          (5 Points)               [ ] Elective arthroplasty                                         (5 Points)                                                                                                                                               HEMATOLOGY RELATED FACTORS                                                 TRAUMA RELATED RISK FACTORS  [ ] Prior episodes of VTE                                     (3 Points)                [ ] Fracture of the hip, pelvis, or leg                       (5 Points)  [ ] Positive family history for VTE                         (3 Points)                 [ ] Acute spinal cord injury (in the previous month)  (5 Points)  [ ] Prothrombin 79689 A                                     (3 Points)                 [ ] Paralysis  (less than 1 month)                             (5 Points)  [ ] Factor V Leiden                                             (3 Points)                  [ ] Multiple Trauma within 1 month                        (5 Points)  [ ] Lupus anticoagulants                                     (3 Points)                                                           [ ] Anticardiolipin antibodies                               (3 Points)                                                       [ ] High homocysteine in the blood                      (3 Points)                                             [ ] Other congenital or acquired thrombophilia      (3 Points)                                                [ ] Heparin induced thrombocytopenia                  (3 Points)                                          Total Score [     4     ]    Caprini Score 0 - 2:  Low Risk, No VTE Prophylaxis required for most patients, encourage ambulation  Caprini Score 3 - 6:  At Risk, pharmacologic VTE prophylaxis is indicated for most patients (in the absence of a contraindication)  Caprini Score Greater than or = 7:  High Risk, pharmacologic VTE prophylaxis is indicated for most patients (in the absence of a contraindication)    Caprini score indicates that the patient is high risk for VTE event ( score 6 or greater). Surgical patient's in this group will benefit from both pharmacologic prophylaxis and intermittent compression devices . Surgical team will determine the balance between VTE  risk and bleeding risk and other clinical considerations

## 2020-10-23 NOTE — H&P PST ADULT - NSICDXPROBLEM_GEN_ALL_CORE_FT
PROBLEM DIAGNOSES  Problem: Unilateral inguinal hernia, without obstruction or gangrene, not specified as recurrent  Assessment and Plan: Laparoscopic left inguinal hernia repair.

## 2020-10-23 NOTE — H&P PST ADULT - HISTORY OF PRESENT ILLNESS
55 year old male with no significant past medical history present with left inguinal. He reports that the hernia has been present for the past 4 years, he denies pain, and discomfort. He is scheduled for a left inguinal hernia repair on 11/5/2020.    He denies, fever, cough. SOB, recent travels, and sick contacts.

## 2020-10-23 NOTE — H&P PST ADULT - NSICDXPASTMEDICALHX_GEN_ALL_CORE_FT
PAST MEDICAL HISTORY:  History of vertigo     IBS (irritable bowel syndrome)     Osteoarthritis of right hip     Post-nasal drip

## 2020-10-23 NOTE — H&P PST ADULT - SKIN
Skin No lesions  pink .  ·  HEENT AF flat, sutures open with no clefts. .  ·  Head normocephalic .  ·  Ears normal .  ·  Eyes unable to assess red reflex .  ·  Nose normal .  ·  Mouth normal .  ·  Neck no masses, midline trachea, clavicles intact .  ·  Chest symmetrical conformation with clear breath sounds bilaterally. .  ·  Heart Normal precordial activity. No murmur appreciated. .  ·  Abdomen soft, non-tender with normal bowel sounds and no significant organomegaly. .  ·  Back normal  gluteal creases - symmetric.  ·  Extremities both hips stable .  ·  Genitalia boy  male  both testes descended .  ·  Neurological normal tone and reflexes with symmetrical spontaneous movement. . .
No lesions; no rash

## 2020-10-23 NOTE — H&P PST ADULT - NSICDXPASTSURGICALHX_GEN_ALL_CORE_FT
PAST SURGICAL HISTORY:  History of inguinal hernia repair infancy    History of nasal surgery 20 ya    History of right hip replacement

## 2020-10-27 PROBLEM — Z87.898 PERSONAL HISTORY OF OTHER SPECIFIED CONDITIONS: Chronic | Status: ACTIVE | Noted: 2020-10-23

## 2020-10-27 PROBLEM — M16.11 UNILATERAL PRIMARY OSTEOARTHRITIS, RIGHT HIP: Chronic | Status: ACTIVE | Noted: 2020-06-26

## 2020-11-01 DIAGNOSIS — Z01.818 ENCOUNTER FOR OTHER PREPROCEDURAL EXAMINATION: ICD-10-CM

## 2020-11-02 ENCOUNTER — APPOINTMENT (OUTPATIENT)
Dept: DISASTER EMERGENCY | Facility: CLINIC | Age: 55
End: 2020-11-02

## 2020-11-03 LAB — SARS-COV-2 N GENE NPH QL NAA+PROBE: NOT DETECTED

## 2020-11-04 ENCOUNTER — TRANSCRIPTION ENCOUNTER (OUTPATIENT)
Age: 55
End: 2020-11-04

## 2020-11-05 ENCOUNTER — APPOINTMENT (OUTPATIENT)
Dept: SURGERY | Facility: HOSPITAL | Age: 55
End: 2020-11-05

## 2020-11-05 ENCOUNTER — OUTPATIENT (OUTPATIENT)
Dept: OUTPATIENT SERVICES | Facility: HOSPITAL | Age: 55
LOS: 1 days | Discharge: HOME | End: 2020-11-05
Payer: COMMERCIAL

## 2020-11-05 VITALS
OXYGEN SATURATION: 99 % | HEIGHT: 65 IN | TEMPERATURE: 98 F | WEIGHT: 164.91 LBS | HEART RATE: 76 BPM | DIASTOLIC BLOOD PRESSURE: 70 MMHG | SYSTOLIC BLOOD PRESSURE: 129 MMHG | RESPIRATION RATE: 17 BRPM

## 2020-11-05 VITALS
HEART RATE: 78 BPM | RESPIRATION RATE: 14 BRPM | OXYGEN SATURATION: 98 % | DIASTOLIC BLOOD PRESSURE: 76 MMHG | SYSTOLIC BLOOD PRESSURE: 128 MMHG

## 2020-11-05 DIAGNOSIS — Z98.890 OTHER SPECIFIED POSTPROCEDURAL STATES: Chronic | ICD-10-CM

## 2020-11-05 DIAGNOSIS — Z96.641 PRESENCE OF RIGHT ARTIFICIAL HIP JOINT: Chronic | ICD-10-CM

## 2020-11-05 PROCEDURE — 49650 LAP ING HERNIA REPAIR INIT: CPT | Mod: AS

## 2020-11-05 PROCEDURE — 49650 LAP ING HERNIA REPAIR INIT: CPT

## 2020-11-05 RX ORDER — ONDANSETRON 8 MG/1
4 TABLET, FILM COATED ORAL ONCE
Refills: 0 | Status: DISCONTINUED | OUTPATIENT
Start: 2020-11-05 | End: 2020-11-05

## 2020-11-05 RX ORDER — SODIUM CHLORIDE 9 MG/ML
1000 INJECTION, SOLUTION INTRAVENOUS
Refills: 0 | Status: DISCONTINUED | OUTPATIENT
Start: 2020-11-05 | End: 2020-11-05

## 2020-11-05 RX ORDER — SODIUM CHLORIDE 9 MG/ML
3 INJECTION INTRAMUSCULAR; INTRAVENOUS; SUBCUTANEOUS EVERY 8 HOURS
Refills: 0 | Status: DISCONTINUED | OUTPATIENT
Start: 2020-11-05 | End: 2020-11-05

## 2020-11-05 RX ORDER — HYDROMORPHONE HYDROCHLORIDE 2 MG/ML
0.5 INJECTION INTRAMUSCULAR; INTRAVENOUS; SUBCUTANEOUS
Refills: 0 | Status: DISCONTINUED | OUTPATIENT
Start: 2020-11-05 | End: 2020-11-05

## 2020-11-05 RX ORDER — IPRATROPIUM BROMIDE 21 MCG
2 AEROSOL, SPRAY (ML) NASAL
Qty: 0 | Refills: 0 | DISCHARGE

## 2020-11-05 RX ADMIN — HYDROMORPHONE HYDROCHLORIDE 0.5 MILLIGRAM(S): 2 INJECTION INTRAMUSCULAR; INTRAVENOUS; SUBCUTANEOUS at 10:25

## 2020-11-05 RX ADMIN — SODIUM CHLORIDE 50 MILLILITER(S): 9 INJECTION, SOLUTION INTRAVENOUS at 10:14

## 2020-11-05 RX ADMIN — HYDROMORPHONE HYDROCHLORIDE 0.5 MILLIGRAM(S): 2 INJECTION INTRAMUSCULAR; INTRAVENOUS; SUBCUTANEOUS at 10:35

## 2020-11-05 NOTE — ASU PATIENT PROFILE, ADULT - PSH
History of inguinal hernia repair  infancy  History of nasal surgery  20 ya  History of right hip replacement

## 2020-11-05 NOTE — ASU PATIENT PROFILE, ADULT - PMH
History of vertigo    IBS (irritable bowel syndrome)    Osteoarthritis of right hip  s/p hip replacement on 7/2020  Post-nasal drip

## 2020-11-05 NOTE — ASU DISCHARGE PLAN (ADULT/PEDIATRIC) - CALL YOUR DOCTOR IF YOU HAVE ANY OF THE FOLLOWING:
Bleeding that does not stop/Unable to urinate/Fever greater than (need to indicate Fahrenheit or Celsius)/Wound/Surgical Site with redness, or foul smelling discharge or pus/Swelling that gets worse/Nausea and vomiting that does not stop

## 2020-11-05 NOTE — ASU PATIENT PROFILE, ADULT - TEACHING/LEARNING CULTURAL CONSIDERATIONS
Patient requesting refill of Cymbalta last refilled 6-6-2017 and Lyrica last refilled 5-2-2017.  Patient last seen 8-9-2017.  Please advise, thank you.   none

## 2020-11-05 NOTE — BRIEF OPERATIVE NOTE - NSICDXBRIEFPROCEDURE_GEN_ALL_CORE_FT
PROCEDURES:  Repair, hernia, umbilical, adult 05-Nov-2020 09:58:50  Krishna Bradford  Laparoscopic herniorrhaphy of left inguinal hernia by total extraperitoneal approach 05-Nov-2020 09:58:41  Krishna Bradford

## 2020-11-10 DIAGNOSIS — K42.0 UMBILICAL HERNIA WITH OBSTRUCTION, WITHOUT GANGRENE: ICD-10-CM

## 2020-11-10 DIAGNOSIS — R42 DIZZINESS AND GIDDINESS: ICD-10-CM

## 2020-11-10 DIAGNOSIS — K58.9 IRRITABLE BOWEL SYNDROME WITHOUT DIARRHEA: ICD-10-CM

## 2020-11-10 DIAGNOSIS — K40.91 UNILATERAL INGUINAL HERNIA, WITHOUT OBSTRUCTION OR GANGRENE, RECURRENT: ICD-10-CM

## 2020-11-10 DIAGNOSIS — Z88.0 ALLERGY STATUS TO PENICILLIN: ICD-10-CM

## 2020-11-10 DIAGNOSIS — K40.90 UNILATERAL INGUINAL HERNIA, WITHOUT OBSTRUCTION OR GANGRENE, NOT SPECIFIED AS RECURRENT: ICD-10-CM

## 2020-11-10 DIAGNOSIS — M19.90 UNSPECIFIED OSTEOARTHRITIS, UNSPECIFIED SITE: ICD-10-CM

## 2020-11-16 ENCOUNTER — APPOINTMENT (OUTPATIENT)
Dept: SURGERY | Facility: CLINIC | Age: 55
End: 2020-11-16
Payer: COMMERCIAL

## 2020-11-16 VITALS
WEIGHT: 160 LBS | TEMPERATURE: 97.3 F | HEART RATE: 94 BPM | HEIGHT: 68 IN | DIASTOLIC BLOOD PRESSURE: 92 MMHG | BODY MASS INDEX: 24.25 KG/M2 | SYSTOLIC BLOOD PRESSURE: 135 MMHG

## 2020-11-16 PROCEDURE — 99024 POSTOP FOLLOW-UP VISIT: CPT

## 2020-12-08 ENCOUNTER — APPOINTMENT (OUTPATIENT)
Dept: ORTHOPEDIC SURGERY | Facility: CLINIC | Age: 55
End: 2020-12-08
Payer: COMMERCIAL

## 2020-12-08 VITALS
DIASTOLIC BLOOD PRESSURE: 90 MMHG | TEMPERATURE: 97.4 F | SYSTOLIC BLOOD PRESSURE: 136 MMHG | HEART RATE: 72 BPM | HEIGHT: 68 IN

## 2020-12-08 PROCEDURE — 99072 ADDL SUPL MATRL&STAF TM PHE: CPT

## 2020-12-08 PROCEDURE — 99213 OFFICE O/P EST LOW 20 MIN: CPT

## 2020-12-08 NOTE — DISCUSSION/SUMMARY
[Medication Risks Reviewed] : Medication risks reviewed [de-identified] : Renewal of physical therapy was provided to the patient today.\par \par [facility]\par Sanjeev Lopes MD

## 2020-12-08 NOTE — HISTORY OF PRESENT ILLNESS
[de-identified] : Patient is s/p right total hip replacement and has been complaining of stiffness, especially with using stairs patient is using a cane. Patient is also experiencing stiffness getting up from a seated position.\par Patient had returned to physical therapy and some of the stiffness has subsided but does continue, and is here to discuss with Dr Lopes if there is anything that he should be concerned about. [Pain Location] : pain [Improving] : improving [de-identified] : stairs and getting up from a seated position

## 2020-12-08 NOTE — REASON FOR VISIT
[Follow-Up Visit] : a follow-up visit for [Artificial Hip Joint] : artificial hip joint [FreeTextEntry2] : Right  total hip replacement 7/1/2020

## 2020-12-08 NOTE — PHYSICAL EXAM
[LE] : Sensory: Intact in bilateral lower extremities [DP] : dorsalis pedis 2+ and symmetric bilaterally [Sacroiliac Stoney-Fabere Test Left Side] : negative Adelso [Left Hip Was Examined] : negative impingement test [Normal RLE] : Right Lower Extremity: No scars, rashes, lesions, ulcers, skin intact [Normal LLE] : Left Lower Extremity: No scars, rashes, lesions, ulcers, skin intact [Normal Touch] : sensation intact for touch [Normal] : Alert and in no acute distress [Poor Appearance] : well-appearing [Acute Distress] : not in acute distress [Obese] : not obese [de-identified] : Well healed surgical incision of the right total hip replacement wit no signs of cellulitis [de-identified] : decreased touch around the surgical incision [de-identified] : Xrays from September 1, 2020 were observed by Dr Lopes \par Radiographs 2 views,  the pelvis and right hip were performed today. There are stable interfaces between bone and implant in the femur and acetabulum. There is stable positioning of the femoral and acetabular components. There is no fracture, foreign body, subsidence, osteolysis, or notable effusion. The lesser trochanters of each femur are aligned on Shenton's line. No heterotopic ossification is noted

## 2021-03-30 ENCOUNTER — APPOINTMENT (OUTPATIENT)
Dept: UROLOGY | Facility: CLINIC | Age: 56
End: 2021-03-30
Payer: COMMERCIAL

## 2021-03-30 DIAGNOSIS — R35.0 FREQUENCY OF MICTURITION: ICD-10-CM

## 2021-03-30 DIAGNOSIS — N48.6 INDURATION PENIS PLASTICA: ICD-10-CM

## 2021-03-30 PROCEDURE — 99072 ADDL SUPL MATRL&STAF TM PHE: CPT

## 2021-03-30 PROCEDURE — 99244 OFF/OP CNSLTJ NEW/EST MOD 40: CPT

## 2021-03-30 NOTE — PHYSICAL EXAM
[General Appearance - Well Developed] : well developed [Normal Appearance] : normal appearance [General Appearance - Well Nourished] : well nourished [Well Groomed] : well groomed [General Appearance - In No Acute Distress] : no acute distress [Edema] : no peripheral edema [Respiration, Rhythm And Depth] : normal respiratory rhythm and effort [Exaggerated Use Of Accessory Muscles For Inspiration] : no accessory muscle use [Abdomen Tenderness] : non-tender [Abdomen Soft] : soft [Costovertebral Angle Tenderness] : no ~M costovertebral angle tenderness [Urethral Meatus] : meatus normal [Urinary Bladder Findings] : the bladder was normal on palpation [Scrotum] : the scrotum was normal [No Prostate Nodules] : no prostate nodules [Testes Mass (___cm)] : there were no testicular masses [Normal Station and Gait] : the gait and station were normal for the patient's age [] : no rash [No Focal Deficits] : no focal deficits [Oriented To Time, Place, And Person] : oriented to person, place, and time [Affect] : the affect was normal [Mood] : the mood was normal [Not Anxious] : not anxious [No Palpable Adenopathy] : no palpable adenopathy

## 2021-03-31 LAB
APPEARANCE: ABNORMAL
BACTERIA: NEGATIVE
BILIRUBIN URINE: NEGATIVE
BLOOD URINE: NORMAL
COLOR: ABNORMAL
GLUCOSE QUALITATIVE U: NEGATIVE
HYALINE CASTS: 1 /LPF
KETONES URINE: NEGATIVE
LEUKOCYTE ESTERASE URINE: NEGATIVE
MICROSCOPIC-UA: NORMAL
NITRITE URINE: NEGATIVE
PH URINE: 5.5
PROTEIN URINE: ABNORMAL
RED BLOOD CELLS URINE: 3 /HPF
SPECIFIC GRAVITY URINE: 1.03
SQUAMOUS EPITHELIAL CELLS: 0 /HPF
UROBILINOGEN URINE: NORMAL
WHITE BLOOD CELLS URINE: 1 /HPF

## 2021-06-17 NOTE — PHYSICAL THERAPY INITIAL EVALUATION ADULT - ADL SKILLS, REHAB EVAL
Message sent to Oliver CARTER asking him to place order to dispense walker for patient as she does not have one    independent

## 2021-10-21 ENCOUNTER — NON-APPOINTMENT (OUTPATIENT)
Age: 56
End: 2021-10-21

## 2021-10-21 ENCOUNTER — APPOINTMENT (OUTPATIENT)
Dept: ORTHOPEDIC SURGERY | Facility: CLINIC | Age: 56
End: 2021-10-21
Payer: COMMERCIAL

## 2021-10-21 VITALS
WEIGHT: 160 LBS | DIASTOLIC BLOOD PRESSURE: 80 MMHG | HEART RATE: 58 BPM | BODY MASS INDEX: 24.25 KG/M2 | HEIGHT: 68 IN | SYSTOLIC BLOOD PRESSURE: 124 MMHG

## 2021-10-21 PROCEDURE — 73502 X-RAY EXAM HIP UNI 2-3 VIEWS: CPT | Mod: RT

## 2021-10-21 PROCEDURE — 99212 OFFICE O/P EST SF 10 MIN: CPT

## 2021-10-21 RX ORDER — OMEPRAZOLE 20 MG/1
20 CAPSULE, DELAYED RELEASE ORAL
Qty: 30 | Refills: 0 | Status: DISCONTINUED | COMMUNITY
Start: 2020-07-01 | End: 2021-10-21

## 2021-10-21 RX ORDER — OXYCODONE 5 MG/1
5 TABLET ORAL
Qty: 10 | Refills: 0 | Status: DISCONTINUED | COMMUNITY
Start: 2020-09-01 | End: 2021-10-21

## 2021-10-21 RX ORDER — OXYCODONE 5 MG/1
5 TABLET ORAL
Qty: 42 | Refills: 0 | Status: DISCONTINUED | COMMUNITY
Start: 2020-07-02 | End: 2021-10-21

## 2021-12-07 ENCOUNTER — APPOINTMENT (OUTPATIENT)
Dept: ORTHOPEDIC SURGERY | Facility: CLINIC | Age: 56
End: 2021-12-07
Payer: COMMERCIAL

## 2021-12-07 VITALS
HEIGHT: 68 IN | SYSTOLIC BLOOD PRESSURE: 135 MMHG | BODY MASS INDEX: 25.46 KG/M2 | WEIGHT: 168 LBS | DIASTOLIC BLOOD PRESSURE: 86 MMHG | HEART RATE: 76 BPM

## 2021-12-07 DIAGNOSIS — Z96.641 PRESENCE OF RIGHT ARTIFICIAL HIP JOINT: ICD-10-CM

## 2021-12-07 DIAGNOSIS — Z91.81 HISTORY OF FALLING: ICD-10-CM

## 2021-12-07 PROCEDURE — 73502 X-RAY EXAM HIP UNI 2-3 VIEWS: CPT | Mod: LT

## 2021-12-07 PROCEDURE — 99214 OFFICE O/P EST MOD 30 MIN: CPT

## 2022-04-05 ENCOUNTER — APPOINTMENT (OUTPATIENT)
Dept: UROLOGY | Facility: CLINIC | Age: 57
End: 2022-04-05

## 2022-12-07 NOTE — H&P PST ADULT - RESPIRATORY AND THORAX
Problem: At Risk for Falls  Goal: # Patient does not fall  Outcome: Outcome Met, Continue evaluating goal progress toward completion  Goal: # Takes action to control fall-related risks  Outcome: Outcome Met, Continue evaluating goal progress toward completion  Goal: # Verbalizes understanding of fall risk/precautions  Description: Document education using the patient education activity  Outcome: Outcome Met, Continue evaluating goal progress toward completion     Problem: At Risk for Injury Due to Fall  Goal: # Patient does not fall  Outcome: Outcome Met, Continue evaluating goal progress toward completion  Goal: # Takes action to control condition specific risks  Outcome: Outcome Met, Continue evaluating goal progress toward completion  Goal: # Verbalizes understanding of fall-related injury personal risks  Description: Document education using the patient education activity  Outcome: Outcome Met, Continue evaluating goal progress toward completion     Problem: Diabetes  Goal: Glycemic balance achieved/maintained  Description: Goal is to maintain blood sugar within range with no episodes of hypoglycemia  Outcome: Outcome Met, Continue evaluating goal progress toward completion  Goal: Verbalizes/demonstrates understanding of Diabetes  Description: Document on Patient Education Activity  Outcome: Outcome Met, Continue evaluating goal progress toward completion  Goal: Demonstrates ability to self-administer insulin  Description: Document on Patient Education Activity  Outcome: Outcome Met, Continue evaluating goal progress toward completion     
negative

## 2024-07-05 NOTE — ASU DISCHARGE PLAN (ADULT/PEDIATRIC) - BATHING
Patient called to convey message from Dr. Durdevic regarding nurse visit this AM for BP check. Patient states understanding and reports he has enough 20 mg tablets to take two daily to make new 40 mg dose ordered to supply until upcoming nurse visit in two weeks for follow-up BP.   Shower only/after 24hrs
